# Patient Record
Sex: FEMALE | Race: BLACK OR AFRICAN AMERICAN | NOT HISPANIC OR LATINO | URBAN - METROPOLITAN AREA
[De-identification: names, ages, dates, MRNs, and addresses within clinical notes are randomized per-mention and may not be internally consistent; named-entity substitution may affect disease eponyms.]

---

## 2022-05-15 ENCOUNTER — EMERGENCY (EMERGENCY)
Facility: HOSPITAL | Age: 22
LOS: 1 days | Discharge: HOME | End: 2022-05-15
Attending: EMERGENCY MEDICINE
Payer: MEDICAID

## 2022-05-15 VITALS
TEMPERATURE: 99 F | OXYGEN SATURATION: 98 % | HEIGHT: 63 IN | RESPIRATION RATE: 18 BRPM | SYSTOLIC BLOOD PRESSURE: 149 MMHG | WEIGHT: 163.14 LBS | DIASTOLIC BLOOD PRESSURE: 85 MMHG | HEART RATE: 100 BPM

## 2022-05-15 LAB
ALBUMIN SERPL ELPH-MCNC: 3.5 G/DL — SIGNIFICANT CHANGE UP (ref 3.5–5)
ALP SERPL-CCNC: 48 U/L — SIGNIFICANT CHANGE UP (ref 40–120)
ALT FLD-CCNC: 31 U/L DA — SIGNIFICANT CHANGE UP (ref 10–60)
ANION GAP SERPL CALC-SCNC: 8 MMOL/L — SIGNIFICANT CHANGE UP (ref 5–17)
APPEARANCE UR: CLEAR — SIGNIFICANT CHANGE UP
AST SERPL-CCNC: 19 U/L — SIGNIFICANT CHANGE UP (ref 10–40)
BASOPHILS # BLD AUTO: 0.07 K/UL — SIGNIFICANT CHANGE UP (ref 0–0.2)
BASOPHILS NFR BLD AUTO: 0.7 % — SIGNIFICANT CHANGE UP (ref 0–2)
BILIRUB SERPL-MCNC: 0.6 MG/DL — SIGNIFICANT CHANGE UP (ref 0.2–1.2)
BILIRUB UR-MCNC: ABNORMAL
BUN SERPL-MCNC: 7 MG/DL — SIGNIFICANT CHANGE UP (ref 7–18)
CALCIUM SERPL-MCNC: 9.7 MG/DL — SIGNIFICANT CHANGE UP (ref 8.4–10.5)
CHLORIDE SERPL-SCNC: 102 MMOL/L — SIGNIFICANT CHANGE UP (ref 96–108)
CO2 SERPL-SCNC: 25 MMOL/L — SIGNIFICANT CHANGE UP (ref 22–31)
COLOR SPEC: YELLOW — SIGNIFICANT CHANGE UP
CREAT SERPL-MCNC: 0.68 MG/DL — SIGNIFICANT CHANGE UP (ref 0.5–1.3)
DIFF PNL FLD: ABNORMAL
EGFR: 126 ML/MIN/1.73M2 — SIGNIFICANT CHANGE UP
EOSINOPHIL # BLD AUTO: 0.17 K/UL — SIGNIFICANT CHANGE UP (ref 0–0.5)
EOSINOPHIL NFR BLD AUTO: 1.6 % — SIGNIFICANT CHANGE UP (ref 0–6)
GLUCOSE SERPL-MCNC: 93 MG/DL — SIGNIFICANT CHANGE UP (ref 70–99)
GLUCOSE UR QL: NEGATIVE — SIGNIFICANT CHANGE UP
HCG SERPL-ACNC: HIGH MIU/ML
HCG UR QL: POSITIVE
HCT VFR BLD CALC: 40.6 % — SIGNIFICANT CHANGE UP (ref 34.5–45)
HGB BLD-MCNC: 14.1 G/DL — SIGNIFICANT CHANGE UP (ref 11.5–15.5)
HIV 1+2 AB+HIV1 P24 AG SERPL QL IA: SIGNIFICANT CHANGE UP
IMM GRANULOCYTES NFR BLD AUTO: 0.2 % — SIGNIFICANT CHANGE UP (ref 0–1.5)
KETONES UR-MCNC: ABNORMAL
LEUKOCYTE ESTERASE UR-ACNC: ABNORMAL
LYMPHOCYTES # BLD AUTO: 1.86 K/UL — SIGNIFICANT CHANGE UP (ref 1–3.3)
LYMPHOCYTES # BLD AUTO: 17.8 % — SIGNIFICANT CHANGE UP (ref 13–44)
MCHC RBC-ENTMCNC: 29.4 PG — SIGNIFICANT CHANGE UP (ref 27–34)
MCHC RBC-ENTMCNC: 34.7 GM/DL — SIGNIFICANT CHANGE UP (ref 32–36)
MCV RBC AUTO: 84.6 FL — SIGNIFICANT CHANGE UP (ref 80–100)
MONOCYTES # BLD AUTO: 0.4 K/UL — SIGNIFICANT CHANGE UP (ref 0–0.9)
MONOCYTES NFR BLD AUTO: 3.8 % — SIGNIFICANT CHANGE UP (ref 2–14)
NEUTROPHILS # BLD AUTO: 7.91 K/UL — HIGH (ref 1.8–7.4)
NEUTROPHILS NFR BLD AUTO: 75.9 % — SIGNIFICANT CHANGE UP (ref 43–77)
NITRITE UR-MCNC: NEGATIVE — SIGNIFICANT CHANGE UP
NRBC # BLD: 0 /100 WBCS — SIGNIFICANT CHANGE UP (ref 0–0)
PH UR: 6 — SIGNIFICANT CHANGE UP (ref 5–8)
PLATELET # BLD AUTO: 355 K/UL — SIGNIFICANT CHANGE UP (ref 150–400)
POTASSIUM SERPL-MCNC: 3.6 MMOL/L — SIGNIFICANT CHANGE UP (ref 3.5–5.3)
POTASSIUM SERPL-SCNC: 3.6 MMOL/L — SIGNIFICANT CHANGE UP (ref 3.5–5.3)
PROT SERPL-MCNC: 7.6 G/DL — SIGNIFICANT CHANGE UP (ref 6–8.3)
PROT UR-MCNC: 30 MG/DL
RBC # BLD: 4.8 M/UL — SIGNIFICANT CHANGE UP (ref 3.8–5.2)
RBC # FLD: 12.5 % — SIGNIFICANT CHANGE UP (ref 10.3–14.5)
SODIUM SERPL-SCNC: 135 MMOL/L — SIGNIFICANT CHANGE UP (ref 135–145)
SP GR SPEC: 1.02 — SIGNIFICANT CHANGE UP (ref 1.01–1.02)
UROBILINOGEN FLD QL: 4
WBC # BLD: 10.43 K/UL — SIGNIFICANT CHANGE UP (ref 3.8–10.5)
WBC # FLD AUTO: 10.43 K/UL — SIGNIFICANT CHANGE UP (ref 3.8–10.5)

## 2022-05-15 PROCEDURE — 87077 CULTURE AEROBIC IDENTIFY: CPT

## 2022-05-15 PROCEDURE — 84702 CHORIONIC GONADOTROPIN TEST: CPT

## 2022-05-15 PROCEDURE — 96374 THER/PROPH/DIAG INJ IV PUSH: CPT

## 2022-05-15 PROCEDURE — 36415 COLL VENOUS BLD VENIPUNCTURE: CPT

## 2022-05-15 PROCEDURE — 81001 URINALYSIS AUTO W/SCOPE: CPT

## 2022-05-15 PROCEDURE — 81025 URINE PREGNANCY TEST: CPT

## 2022-05-15 PROCEDURE — 99284 EMERGENCY DEPT VISIT MOD MDM: CPT | Mod: 25

## 2022-05-15 PROCEDURE — 87389 HIV-1 AG W/HIV-1&-2 AB AG IA: CPT

## 2022-05-15 PROCEDURE — 80053 COMPREHEN METABOLIC PANEL: CPT

## 2022-05-15 PROCEDURE — 99285 EMERGENCY DEPT VISIT HI MDM: CPT

## 2022-05-15 PROCEDURE — 87086 URINE CULTURE/COLONY COUNT: CPT

## 2022-05-15 PROCEDURE — 76801 OB US < 14 WKS SINGLE FETUS: CPT | Mod: 26

## 2022-05-15 PROCEDURE — 85025 COMPLETE CBC W/AUTO DIFF WBC: CPT

## 2022-05-15 PROCEDURE — 76801 OB US < 14 WKS SINGLE FETUS: CPT

## 2022-05-15 RX ORDER — ONDANSETRON 8 MG/1
4 TABLET, FILM COATED ORAL ONCE
Refills: 0 | Status: COMPLETED | OUTPATIENT
Start: 2022-05-15 | End: 2022-05-15

## 2022-05-15 RX ORDER — SODIUM CHLORIDE 9 MG/ML
1000 INJECTION INTRAMUSCULAR; INTRAVENOUS; SUBCUTANEOUS ONCE
Refills: 0 | Status: COMPLETED | OUTPATIENT
Start: 2022-05-15 | End: 2022-05-15

## 2022-05-15 RX ADMIN — ONDANSETRON 4 MILLIGRAM(S): 8 TABLET, FILM COATED ORAL at 13:08

## 2022-05-15 RX ADMIN — SODIUM CHLORIDE 1000 MILLILITER(S): 9 INJECTION INTRAMUSCULAR; INTRAVENOUS; SUBCUTANEOUS at 13:09

## 2022-05-15 NOTE — ED PROVIDER NOTE - OBJECTIVE STATEMENT
pt is 11/12 weeks pregnant with vag spotting has been having a lot of n/v since start of pregnancy   no abd pain no cough no fever no dysuria

## 2022-05-15 NOTE — ED PROVIDER NOTE - NSFOLLOWUPINSTRUCTIONS_ED_ALL_ED_FT
Threatened Miscarriage      A threatened miscarriage is when a woman bleeds in her vagina during the first 20 weeks of pregnancy but the pregnancy has not ended. The doctor will do tests to make sure you are still pregnant. This condition does not mean your pregnancy will end, but it does increase the risk that it will end (miscarriage).      What are the causes?    Normally, the cause of this condition is not known.      What increases the risk?    These things may make a pregnant woman more likely to lose a pregnancy:    Certain health problems     •Conditions that affect hormones, such as thyroid disease or polycystic ovary syndrome.      •Diabetes.      •Disorders that cause the body's disease-fighting system to attack itself by mistake.      •Infections.      •Bleeding problems.      •Being very overweight.      Lifestyle factors     •Using products that have tobacco or nicotine in them.      •Being around tobacco smoke.      •Having a lot of caffeine.      •Using drugs.      Problems with reproductive organs or parts     •Having a cervix that opens and thins before you are ready to give birth. The cervix is the lowest part of the womb.    •Having Asherman syndrome, which leads to:  •Scars in the womb.      •The womb being an abnormal shape.        •Growths (fibroids) in the womb.      •Problems in the body that are present at birth.      •Infection of the cervix or womb.      Personal or health history     •Injury.      •Having lost an unborn baby before.      •Being younger than age 18 or older than age 35.      •Being around a harmful substance, such as radiation.    •Having lead or other heavy metals in:  •Things you eat or drink.      •The air around you.        •Using certain medicines.        What are the signs or symptoms?    •Bleeding from the vagina. You may also have cramps or pain.      •Mild pain or cramps in your belly.        How is this diagnosed?     •The doctor will do tests, such as an ultrasound to make sure you are still pregnant.        How is this treated?    There are no treatments that prevent loss of pregnancy. But you need to do the right things to take care of yourself at home.      Follow these instructions at home:    •Get a lot of rest.      • Do not have sex or douche if there is bleeding in the vagina.      • Do not put things such as tampons in the vagina if it is bleeding.      • Do not smoke or use drugs.      • Do not drink alcohol.      •Avoid caffeine.      •Keep all follow-up visits while you are pregnant.        Contact a doctor if:  •You are pregnant and you have one of these:  •Light bleeding coming from your vagina.      •Spots of blood coming from your vagina.        •You have belly pain or cramping.      •You have a fever.        Get help right away if:    •Blood soaks through 2 large pads an hour for more than 2 hours.      •Clots of blood come from your vagina.      •Tissue comes out of your vagina.      •Fluid leaks or gushes from your vagina.      •You have very bad pain in your low back.      •You have very bad cramps in your belly.      •You have a fever, chills, and very bad belly pain.        Summary    •A threatened miscarriage is when a woman bleeds in her vagina during the first 20 weeks of pregnancy but the pregnancy has not ended.      •Normally, the cause of this condition is not known.      •Symptoms include bleeding in the vagina or mild pain or cramps in your belly.      •There are no treatments that prevent loss of pregnancy.      •Keep all follow-up visits while you are pregnant.      This information is not intended to replace advice given to you by your health care provider. Make sure you discuss any questions you have with your health care provider.

## 2022-05-15 NOTE — ED ADULT NURSE NOTE - OBJECTIVE STATEMENT
Pt. c/o nausea and vomiting that's ongoing for a few weeks. Pt. is 11 weeks pregnant and unable  to keep food down. Pt. c/o pain when vomiting along with vaginal spotting while vomiting but no pain or spotting when not vomiting, as per pt.

## 2022-05-15 NOTE — ED PROVIDER NOTE - PATIENT PORTAL LINK FT
You can access the FollowMyHealth Patient Portal offered by [facility] by registering at the following website: [email or web address]. By joining [email or web address] FollowMyHealth portal, you will also be able to view your health information using other applications (apps) compatible with our system.

## 2022-05-16 LAB
CULTURE RESULTS: SIGNIFICANT CHANGE UP
SPECIMEN SOURCE: SIGNIFICANT CHANGE UP

## 2023-03-15 NOTE — ED PROVIDER NOTE - PSYCHIATRIC, MLM
Immediate family member Alert and oriented to person, place, time/situation. normal mood and affect. no apparent risk to self or others.

## 2023-04-14 NOTE — ED ADULT NURSE NOTE - NSFALLRSKASSESASSIST_ED_ALL_ED
Message read by patient. Encounter closed.   (Last read by Boby Tyler at 1:25 PM on 4/14/2023)     no

## 2024-08-09 NOTE — ED PROVIDER NOTE - PRINCIPAL DIAGNOSIS
----- Message from Camille Mcnair sent at 8/8/2024 12:12 PM CDT -----  Regarding: Feeling overly sleepy during day  Contact: [phone]  Also, I have been told I snore & I don't always think I get the best sleep. I do toss & turn a lot every night. Also get up at least once to use the bathroom.    Miscarriage, threatened, early pregnancy

## 2024-11-07 NOTE — ED PROVIDER NOTE - INTERNATIONAL TRAVEL
MIDLINE CATHETER    Procedure: Insertion of a single lumen BARD PowerGlide Pro Midline  Indication: IV Access for MRI  CONSENT: VERBAL CONSENT OBTAINED    A TIME OUT WAS COMPLETED VERIFYING CORRECT PATIENT, PROCEDURE,SITE,POSITIONIING AND EQUIPMENT.    LINE TYPE: 20 G 10CM MIDLINE CATHETER    LOCATION: basilic vein. The patient was placed in appropriate position for optimal vessel exposure.    PREPARATION: The site was cleaned with chlorhexidine prep.    TECHNIQUE: After identifying the target vein, using ultrasound guidance, sterile technique was used including hand hygiene, mask, chloraprep, sterile gel, probe cover, sterile gloves, StatLock, BioPatch, and transparent dressing per hospital protocol, the needle was inserted into the target vein.  Blood was aspirated from the port and subsequently flushed with normal saline. The catheter was then secured in place with Biopatch and Tegaderm.     LOT#: [id number]  EXP: 07/31/2025    Please Note:  This is a Power injectable PowerGlide Midline and may be used for contrast injections.    Recommendations:  No chest x--ray or ECG confirmation required as this is a peripheral IV. Midline may be used immediately. MRI RN to dc line.     Placed by Venous Access Team                       No